# Patient Record
Sex: FEMALE | Race: WHITE | Employment: FULL TIME | URBAN - METROPOLITAN AREA
[De-identification: names, ages, dates, MRNs, and addresses within clinical notes are randomized per-mention and may not be internally consistent; named-entity substitution may affect disease eponyms.]

---

## 2017-12-06 PROBLEM — Z34.90 TERM PREGNANCY: Status: ACTIVE | Noted: 2017-12-06

## 2017-12-06 PROBLEM — Z34.93 NORMAL PREGNANCY IN THIRD TRIMESTER: Status: ACTIVE | Noted: 2017-12-06

## 2017-12-16 PROBLEM — Z34.93 NORMAL PREGNANCY IN THIRD TRIMESTER: Status: RESOLVED | Noted: 2017-12-06 | Resolved: 2017-12-16

## 2021-06-04 ENCOUNTER — INITIAL CONSULT (OUTPATIENT)
Dept: BARIATRICS/WEIGHT MGMT | Age: 41
End: 2021-06-04
Payer: COMMERCIAL

## 2021-06-04 VITALS
TEMPERATURE: 98.1 F | HEART RATE: 61 BPM | SYSTOLIC BLOOD PRESSURE: 146 MMHG | BODY MASS INDEX: 34.91 KG/M2 | DIASTOLIC BLOOD PRESSURE: 83 MMHG | HEIGHT: 64 IN | WEIGHT: 204.5 LBS

## 2021-06-04 DIAGNOSIS — K30 INDIGESTION: ICD-10-CM

## 2021-06-04 DIAGNOSIS — E66.01 MORBID OBESITY DUE TO EXCESS CALORIES (HCC): Primary | ICD-10-CM

## 2021-06-04 DIAGNOSIS — K21.9 GASTROESOPHAGEAL REFLUX DISEASE WITHOUT ESOPHAGITIS: ICD-10-CM

## 2021-06-04 PROCEDURE — 99202 OFFICE O/P NEW SF 15 MIN: CPT

## 2021-06-04 RX ORDER — SODIUM CHLORIDE 0.9 % (FLUSH) 0.9 %
10 SYRINGE (ML) INJECTION PRN
Status: CANCELLED | OUTPATIENT
Start: 2021-06-04

## 2021-06-04 RX ORDER — SODIUM CHLORIDE 9 MG/ML
25 INJECTION, SOLUTION INTRAVENOUS PRN
Status: CANCELLED | OUTPATIENT
Start: 2021-06-04

## 2021-06-04 RX ORDER — SODIUM CHLORIDE 0.9 % (FLUSH) 0.9 %
10 SYRINGE (ML) INJECTION EVERY 12 HOURS SCHEDULED
Status: CANCELLED | OUTPATIENT
Start: 2021-06-04

## 2021-06-04 RX ORDER — SODIUM CHLORIDE 9 MG/ML
INJECTION, SOLUTION INTRAVENOUS CONTINUOUS
Status: CANCELLED | OUTPATIENT
Start: 2021-06-04

## 2021-06-04 NOTE — PATIENT INSTRUCTIONS
What is the next step to proceed with weight loss surgery? Please be aware that any co-pays or deductibles may be requested prior to testing and / or procedures. You will need to schedule a psychological evaluation for weight loss surgery. Patients will be required to complete all psychological testing as required by the mental health provider. Patients must also follow all of the provider's recommendations before weight loss surgery can be scheduled. The evaluation must be done a standard way for weight loss surgery. We strongly recommend that you contact one of our preferred providers listed below to arrange this:      Pratik Mack, [facility]  [address] [place], [place]   [phone]    [facility] and [address]   [phone]    Dr. Ghulam Mcpherson, PhD    Lata Caraballo. [place], [place]    [phone]      You will also need to plan on attending a 2 hour nutrition class at the [facility] prior to your surgery. We will schedule this for you when we schedule your surgery. Please remember to have your labs drawn 10 days prior to your first scheduled dietary appointment. Please remember, that while we will submit your case to insurance for surgery authorization, it is your responsibility to know if your plan covers weight loss surgery and keep up-to-date with changes to your insurance coverage. We will do everything possible to help you get approved for weight loss surgery, but cannot guarantee an approval.     Please note that you will not be submitted to your insurance company until all pre-operative testing requirements are met.

## 2021-06-04 NOTE — PROGRESS NOTES
tenderness, no echymosis or abrasions  Heart: reg rate, no murmur  Abdomen: soft, nondistended, nontender, obese  Extremities: full ROM all 4 ext, no gross motor or sensory deficits  Pulses: 2+ distal  Skin: warm and dry  Neurologic: spontanous eye opening, purposeful, follows complex commands      Assessment:  Morbid obesity with inability to keep the weight off with diet and exercise. She is interested in Laparoscopic Kale-en- Y Gastric Bypass or Sleeve Gastrectomy. We discussed that our goal is to ameliorate the medical problems and not to obtain a specific body mass index. She understands the risks and benefits, and wishes to proceed with the evaluation. Plan:  Psych evaluation, medical clearance, gallbladder ultrasound. These patients often have vitamin deficiencies so I will do screening labs for malnutrition. I will do an upper endoscopy to check for hiatal hernias, ulcers and H. Pylori while we wait for insurance approval for the surgery. I have spent over 45min in evaluation of the patient including greater than 50% of that time face to face discussing surgical options, medical and surgical history, plans for medical weight loss management and preoperative clearance for surgery. They did not have family present for the discussion and visual aides and drawings were used to explain anatomy and surgical procedures.      Physician Signature: Electronically signed by Dr. Karen Mccrary MD    Send copy of H&P to PCP, Kavya Bray DO

## 2021-06-23 ENCOUNTER — HOSPITAL ENCOUNTER (OUTPATIENT)
Age: 41
Discharge: HOME | End: 2021-06-25
Payer: COMMERCIAL

## 2021-06-23 DIAGNOSIS — Z01.818 PREOP TESTING: ICD-10-CM

## 2021-06-23 PROCEDURE — U0003 INFECTIOUS AGENT DETECTION BY NUCLEIC ACID (DNA OR RNA); SEVERE ACUTE RESPIRATORY SYNDROME CORONAVIRUS 2 (SARS-COV-2) (CORONAVIRUS DISEASE [COVID-19]), AMPLIFIED PROBE TECHNIQUE, MAKING USE OF HIGH THROUGHPUT TECHNOLOGIES AS DESCRIBED BY CMS-2020-01-R: HCPCS

## 2021-06-23 PROCEDURE — U0005 INFEC AGEN DETEC AMPLI PROBE: HCPCS

## 2021-06-24 LAB — SARS-COV-2, PCR: NOT DETECTED

## 2021-06-25 NOTE — PROGRESS NOTES
[address] [facility]                                                                                                                    PRE OP INSTRUCTIONS FOR  Jostin Bartholomew        Date: 6/25/2021    Date of surgery: 6/28/21   Arrival Time: Hospital will call you between 5pm and 7pm with your final arrival time for surgery    1. Do not eat or drink anything after midnight prior to surgery. This includes no water, chewing gum, mints or ice chips. 2. Take the following medications with a small sip of water on the morning of Surgery: none     3. Diabetics may take evening dose of insulin but none after midnight. If you feel symptomatic or low blood sugar morning of surgery drink 1-2 ounces of apple juice only. 4. Aspirin, Ibuprofen, Advil, Naproxen, Vitamin E and other Anti-inflammatory products should be stopped  before surgery  as directed by your physician. Take Tylenol only unless instructed otherwise by your surgeon. 5. Check with your Doctor regarding stopping Plavix, Coumadin, Lovenox, Eliquis, Effient, or other blood thinners. 6. Do not smoke,use illicit drugs and do not drink any alcoholic beverages 24 hours prior to surgery. 7. You may brush your teeth the morning of surgery. DO NOT SWALLOW WATER    8. You MUST make arrangements for a responsible adult to take you home after your surgery. You will not be allowed to leave alone or drive yourself home. It is strongly suggested someone stay with you the first 24 hrs. Your surgery will be cancelled if you do not have a ride home. 9. PEDIATRIC PATIENTS ONLY:  A parent/legal guardian must accompany a child scheduled for surgery and plan to stay at the hospital until the child is discharged. Please do not bring other children with you.     10. Please wear simple, loose fitting clothing to the hospital.  Judy Kruse not bring valuables (money, credit cards, checkbooks, etc.) Do not wear any makeup (including no eye makeup) or nail polish on your fingers or toes. 11. DO NOT wear any jewelry or piercings on day of surgery. All body piercing jewelry must be removed. 12. Shower the night before surgery with _x__Antibacterial soap /JASS WIPES________    13. TOTAL JOINT REPLACEMENT/HYSTERECTOMY PATIENTS ONLY---Remember to bring Blood Bank bracelet to the hospital on the day of surgery. 14. If you have a Living Will and Durable Power of  for Healthcare, please bring in a copy. 15. If appropriate bring crutches, inspirex, WALKER, CANE etc... 12. Notify your Surgeon if you develop any illness between now and surgery time, cough, cold, fever, sore throat, nausea, vomiting, etc.  Please notify your surgeon if you experience dizziness, shortness of breath or blurred vision between now & the time of your surgery. 17. If you have ___dentures, they will be removed before going to the OR; we will provide you a container. If you wear _x__contact lenses or ___glasses, they will be removed; please bring a case for them. 18. To provide excellent care visitors will be limited to 2 in the room at any given time. 19. Please bring picture ID and insurance card. 20. Sleep apnea patients need to bring CPAP AND SETTINGS to hospital on day of surgery. 21. During flu season no children under the age of 15 are permitted in the hospital for the safety of all patients. 22. Other please check in at the main lobby/wear a mask. Please call AMBULATORY CARE if you have any further questions.    [address]     [address]

## 2021-06-28 ENCOUNTER — HOSPITAL ENCOUNTER (OUTPATIENT)
Age: 41
Setting detail: OUTPATIENT SURGERY
Discharge: HOME | End: 2021-06-28
Attending: SURGERY | Admitting: SURGERY
Payer: COMMERCIAL

## 2021-06-28 ENCOUNTER — ANESTHESIA EVENT (OUTPATIENT)
Dept: ENDOSCOPY | Age: 41
End: 2021-06-28
Payer: COMMERCIAL

## 2021-06-28 ENCOUNTER — HOSPITAL ENCOUNTER (OUTPATIENT)
Dept: ULTRASOUND IMAGING | Age: 41
Discharge: HOME | End: 2021-06-28
Payer: COMMERCIAL

## 2021-06-28 ENCOUNTER — ANESTHESIA (OUTPATIENT)
Dept: ENDOSCOPY | Age: 41
End: 2021-06-28
Payer: COMMERCIAL

## 2021-06-28 VITALS
WEIGHT: 202.3 LBS | DIASTOLIC BLOOD PRESSURE: 71 MMHG | RESPIRATION RATE: 16 BRPM | BODY MASS INDEX: 34.54 KG/M2 | HEART RATE: 65 BPM | SYSTOLIC BLOOD PRESSURE: 131 MMHG | TEMPERATURE: 97.8 F | HEIGHT: 64 IN | OXYGEN SATURATION: 99 %

## 2021-06-28 VITALS
RESPIRATION RATE: 6 BRPM | DIASTOLIC BLOOD PRESSURE: 95 MMHG | OXYGEN SATURATION: 93 % | SYSTOLIC BLOOD PRESSURE: 164 MMHG

## 2021-06-28 DIAGNOSIS — E66.01 MORBID OBESITY DUE TO EXCESS CALORIES (HCC): ICD-10-CM

## 2021-06-28 DIAGNOSIS — Z01.818 PREOP TESTING: Primary | ICD-10-CM

## 2021-06-28 DIAGNOSIS — K30 INDIGESTION: ICD-10-CM

## 2021-06-28 LAB
ALBUMIN SERPL-MCNC: 4.2 G/DL (ref 3.5–5.2)
ALP BLD-CCNC: 91 U/L (ref 35–104)
ALT SERPL-CCNC: 19 U/L (ref 0–32)
ANION GAP SERPL CALCULATED.3IONS-SCNC: 9 MMOL/L (ref 7–16)
AST SERPL-CCNC: 18 U/L (ref 0–31)
BILIRUB SERPL-MCNC: 0.5 MG/DL (ref 0–1.2)
BUN BLDV-MCNC: 21 MG/DL (ref 6–20)
CALCIUM SERPL-MCNC: 9.2 MG/DL (ref 8.6–10.2)
CHLORIDE BLD-SCNC: 105 MMOL/L (ref 98–107)
CHOLESTEROL, TOTAL: 175 MG/DL (ref 0–199)
CO2: 26 MMOL/L (ref 22–29)
CREAT SERPL-MCNC: 0.9 MG/DL (ref 0.5–1)
FERRITIN: 18 NG/ML
FOLATE: 9.9 NG/ML (ref 4.8–24.2)
GFR AFRICAN AMERICAN: >60
GFR NON-AFRICAN AMERICAN: >60 ML/MIN/1.73
GLUCOSE BLD-MCNC: 113 MG/DL (ref 74–99)
HBA1C MFR BLD: 4.8 % (ref 4–5.6)
HCG(URINE) PREGNANCY TEST: NEGATIVE
HCT VFR BLD CALC: 40.7 % (ref 34–48)
HEMOGLOBIN: 13.1 G/DL (ref 11.5–15.5)
MCH RBC QN AUTO: 28.5 PG (ref 26–35)
MCHC RBC AUTO-ENTMCNC: 32.2 % (ref 32–34.5)
MCV RBC AUTO: 88.5 FL (ref 80–99.9)
PDW BLD-RTO: 12.9 FL (ref 11.5–15)
PLATELET # BLD: 169 E9/L (ref 130–450)
PMV BLD AUTO: 12.2 FL (ref 7–12)
POTASSIUM SERPL-SCNC: 4.4 MMOL/L (ref 3.5–5)
RBC # BLD: 4.6 E12/L (ref 3.5–5.5)
SODIUM BLD-SCNC: 140 MMOL/L (ref 132–146)
TOTAL PROTEIN: 7.2 G/DL (ref 6.4–8.3)
TRIGL SERPL-MCNC: 62 MG/DL (ref 0–149)
VITAMIN B-12: 575 PG/ML (ref 211–946)
VITAMIN D 25-HYDROXY: 25 NG/ML (ref 30–100)
WBC # BLD: 4.6 E9/L (ref 4.5–11.5)

## 2021-06-28 PROCEDURE — 6360000002 HC RX W HCPCS: Performed by: NURSE ANESTHETIST, CERTIFIED REGISTERED

## 2021-06-28 PROCEDURE — 2709999900 HC NON-CHARGEABLE SUPPLY: Performed by: SURGERY

## 2021-06-28 PROCEDURE — 82306 VITAMIN D 25 HYDROXY: CPT

## 2021-06-28 PROCEDURE — 88305 TISSUE EXAM BY PATHOLOGIST: CPT

## 2021-06-28 PROCEDURE — 7100000010 HC PHASE II RECOVERY - FIRST 15 MIN: Performed by: SURGERY

## 2021-06-28 PROCEDURE — 84425 ASSAY OF VITAMIN B-1: CPT

## 2021-06-28 PROCEDURE — 85027 COMPLETE CBC AUTOMATED: CPT

## 2021-06-28 PROCEDURE — 3609012400 HC EGD TRANSORAL BIOPSY SINGLE/MULTIPLE: Performed by: SURGERY

## 2021-06-28 PROCEDURE — 82607 VITAMIN B-12: CPT

## 2021-06-28 PROCEDURE — 83036 HEMOGLOBIN GLYCOSYLATED A1C: CPT

## 2021-06-28 PROCEDURE — 81025 URINE PREGNANCY TEST: CPT

## 2021-06-28 PROCEDURE — 84478 ASSAY OF TRIGLYCERIDES: CPT

## 2021-06-28 PROCEDURE — 3700000000 HC ANESTHESIA ATTENDED CARE: Performed by: SURGERY

## 2021-06-28 PROCEDURE — 82728 ASSAY OF FERRITIN: CPT

## 2021-06-28 PROCEDURE — 2580000003 HC RX 258: Performed by: SURGERY

## 2021-06-28 PROCEDURE — 84630 ASSAY OF ZINC: CPT

## 2021-06-28 PROCEDURE — 80053 COMPREHEN METABOLIC PANEL: CPT

## 2021-06-28 PROCEDURE — 76705 ECHO EXAM OF ABDOMEN: CPT

## 2021-06-28 PROCEDURE — 82465 ASSAY BLD/SERUM CHOLESTEROL: CPT

## 2021-06-28 PROCEDURE — 7100000011 HC PHASE II RECOVERY - ADDTL 15 MIN: Performed by: SURGERY

## 2021-06-28 PROCEDURE — 82746 ASSAY OF FOLIC ACID SERUM: CPT

## 2021-06-28 PROCEDURE — 36415 COLL VENOUS BLD VENIPUNCTURE: CPT

## 2021-06-28 RX ORDER — SODIUM CHLORIDE 9 MG/ML
25 INJECTION, SOLUTION INTRAVENOUS PRN
Status: DISCONTINUED | OUTPATIENT
Start: 2021-06-28 | End: 2021-06-29 | Stop reason: HOSPADM

## 2021-06-28 RX ORDER — SODIUM CHLORIDE 0.9 % (FLUSH) 0.9 %
10 SYRINGE (ML) INJECTION PRN
Status: DISCONTINUED | OUTPATIENT
Start: 2021-06-28 | End: 2021-06-29 | Stop reason: HOSPADM

## 2021-06-28 RX ORDER — PROPOFOL 10 MG/ML
INJECTION, EMULSION INTRAVENOUS CONTINUOUS PRN
Status: DISCONTINUED | OUTPATIENT
Start: 2021-06-28 | End: 2021-06-28 | Stop reason: SDUPTHER

## 2021-06-28 RX ORDER — SODIUM CHLORIDE 9 MG/ML
INJECTION, SOLUTION INTRAVENOUS CONTINUOUS
Status: DISCONTINUED | OUTPATIENT
Start: 2021-06-28 | End: 2021-06-29 | Stop reason: HOSPADM

## 2021-06-28 RX ORDER — SODIUM CHLORIDE 0.9 % (FLUSH) 0.9 %
10 SYRINGE (ML) INJECTION EVERY 12 HOURS SCHEDULED
Status: DISCONTINUED | OUTPATIENT
Start: 2021-06-28 | End: 2021-06-29 | Stop reason: HOSPADM

## 2021-06-28 RX ADMIN — PROPOFOL 140 MCG/KG/MIN: 10 INJECTION, EMULSION INTRAVENOUS at 12:43

## 2021-06-28 RX ADMIN — SODIUM CHLORIDE: 9 INJECTION, SOLUTION INTRAVENOUS at 12:41

## 2021-06-28 ASSESSMENT — PAIN SCALES - GENERAL: PAINLEVEL_OUTOF10: 0

## 2021-06-28 NOTE — ANESTHESIA PRE PROCEDURE
Department of Anesthesiology  Preprocedure Note       Name:  Jose De Jesus Stiles   Age:  46 y.o.  :  1980                                          MRN:  [id number]         Date:  2021      Surgeon: Laila Maldonado):  Roma Ortega MD    Procedure: Procedure(s):  EGD ESOPHAGOGASTRODUODENOSCOPY    Medications prior to admission:   Prior to Admission medications    Medication Sig Start Date End Date Taking?  Authorizing Provider   Multiple Vitamins-Minerals (THERAPEUTIC MULTIVITAMIN-MINERALS) tablet Take 1 tablet by mouth daily    Historical Provider, MD   fluticasone (FLONASE) 50 MCG/ACT nasal spray 2 sprays by Nasal route daily 1/3/17   Marquis Rincon DO       Current medications:    Current Facility-Administered Medications   Medication Dose Route Frequency Provider Last Rate Last Admin    0.9 % sodium chloride infusion   Intravenous Continuous Roma Ortega MD        0.9 % sodium chloride infusion  25 mL Intravenous PRN Roma Ortega MD        sodium chloride flush 0.9 % injection 10 mL  10 mL Intravenous 2 times per day Roma Ortega MD        sodium chloride flush 0.9 % injection 10 mL  10 mL Intravenous PRN Roma Ortega MD           Allergies:  No Known Allergies    Problem List:    Patient Active Problem List   Diagnosis Code    Brachial plexopathy G54.0    Numbness and tingling R20.0, R20.2    Right C6 Radiculopathy M54.10    Term pregnancy Z34.90    Normal vaginal delivery O80       Past Medical History:        Diagnosis Date    Diabetes mellitus ([address]     gestational    Morbid (severe) obesity due to excess calories ([address]     Prolonged emergence from general anesthesia     Rh incompatibility        Past Surgical History:        Procedure Laterality Date    BLADDER SURGERY      TUBAL LIGATION  0079,8390    twice with reversal inbetween       Social History:    Social History     Tobacco Use    Smoking status: Never Smoker    Smokeless tobacco: Never Used   Substance found for: HIV, HEPCAB    COVID-19 Screening (If Applicable):   Lab Results   Component Value Date    COVID19 Not Detected 06/23/2021           Anesthesia Evaluation  Patient summary reviewed no history of anesthetic complications:   Airway: Mallampati: II  TM distance: >3 FB   Neck ROM: full  Mouth opening: > = 3 FB Dental: normal exam     Comment: Upper and lower braces    Pulmonary:Negative Pulmonary ROS breath sounds clear to auscultation                             Cardiovascular:Negative CV ROS            Rhythm: regular             Beta Blocker:  Not on Beta Blocker         Neuro/Psych:   (+) neuromuscular disease:,             GI/Hepatic/Renal:   (+) GERD:, morbid obesity          Endo/Other:    (+) Diabetes (gestational ), . Abdominal:             Vascular: negative vascular ROS. Other Findings:           Anesthesia Plan      MAC     ASA 2       Induction: intravenous. MIPS: Prophylactic antiemetics administered. Anesthetic plan and risks discussed with patient. Plan discussed with CRNA.           [address],    6/28/2021

## 2021-06-28 NOTE — H&P
Diana Gonzalez  6/28/2021  [facility]    Initial Evaluation  Bariatric Surgery and EGD       Subjective:  CHIEF COMPLAINT: Morbid obesity, malnutrition, Degenerative Joint Disease (DJD), Depression, Anxiety and Extremity Edema    HISTORY OF PRESENT ILLNESS: Diana Gonzalez is a morbidly-obese 46 y.o.  female, who weighs 204 lb 8 oz (92.8 kg). She is 55 pounds over her ideal body weight. The severity is severe with Body mass index is 34.72 kg/m². She has multiple medical problems aggravated by her obesity. They have been overweight since age 29. She wishes to have bariatric surgery so that she can lose a large amount of weight to a goal of their ideal body weight based on height, build and sex, and keep the weight off. I have [facility] where we discussed the surgery in great detail and went over the risks and benefits. She has watched our informational video so she understands all of the extensive risks involved. She states that she understands all of the risks and wishes to proceed with the evaluation. We have discussed the different surgical options in detail with use of diagrams and drawings to detail the procedures, risks and alternatives. We discussed the postoperative diet and proposed life long diet for weight management after surgery. They are a nonsmoker  They have no significant exposure to second hand smoke    Past Medical History  Patient Active Problem List   Diagnosis    Brachial plexopathy    Numbness and tingling    Right C6 Radiculopathy    Term pregnancy    Normal vaginal delivery     Past Surgical History  Past Surgical History:   Procedure Laterality Date    BLADDER SURGERY  2013    TUBAL LIGATION  5543,1516    twice with reversal inbetween      Allergies: Patient has no known allergies.      Home Medications  Current Facility-Administered Medications   Medication Dose Route Frequency Provider Last Rate Last Admin    0.9 % sodium chloride infusion   Intravenous Continuous Sybil Callejas  mL/hr at 06/28/21 1246 Rate Change at 06/28/21 1246    0.9 % sodium chloride infusion  25 mL Intravenous PRN Sybil Callejas MD        sodium chloride flush 0.9 % injection 10 mL  10 mL Intravenous 2 times per day Sybil Callejas MD        sodium chloride flush 0.9 % injection 10 mL  10 mL Intravenous PRN Sybil Callejas MD           Social History:   TOBACCO:   reports that she has never smoked. She has never used smokeless tobacco.  All smokers must join the free smoking cessation program and stop smoking for 3 months before having any Bariatric surgery. ETOH:    reports no history of alcohol use. The patient has a family history that is negative for severe cardiovascular or respiratory issues, negative for reaction to anesthesia. Review of Systems    A complete 10 system review was performed and are otherwise negative unless mentioned in the above HPI. Specific negatives are listed below but may not include all those reviewed.     General ROS: negative obtundation, AMS  ENT ROS: negative rhinorrhea, epistaxis  Allergy and Immunology ROS: negative itchy/watery eyes or nasal congestion  Hematological and Lymphatic ROS: negative spontaneous bleeding or bruising  Endocrine ROS: negative  lethargy, mood swings, palpitations or polydipsia/polyuria  Respiratory ROS: negative sputum changes, stridor, tachypnea or wheezing  Cardiovascular ROS: negative for - loss of consciousness, murmur or orthopnea  Gastrointestinal ROS: negative for - hematochezia or hematemesis  Genito-Urinary ROS: negative for -  genital discharge or hematuria  Musculoskeletal ROS: negative for - focal weakness, gangrene  Psych/Neuro ROS: negative for - visual or auditory hallucinations, suicidal ideation      Physical Exam:   VITALS: /71   Pulse 65   Temp 97.8 °F (36.6 °C) (Temporal)   Resp 16   Ht 5' 4\" (1.626 m)   Wt 202 lb 4.8 oz (91.8 kg)   LMP 06/14/2021   SpO2 99%   BMI 34.72 kg/m²   General appearance: AAO, NAD  Eyes: PERRL, EOMI, red conjunctiva  Lungs: equal chest rise bilateral, no wheeze, no rhonchi  Chest wall: atraumatic, no tenderness, no echymosis or abrasions  Heart: reg rate, no murmur  Abdomen: soft, nondistended, nontender, obese  Extremities: full ROM all 4 ext, no gross motor or sensory deficits  Pulses: 2+ distal  Skin: warm and dry  Neurologic: spontanous eye opening, purposeful, follows complex commands      Assessment:  Morbid obesity with inability to keep the weight off with diet and exercise. She is interested in Laparoscopic Kale-en- Y Gastric Bypass or Sleeve Gastrectomy. We discussed that our goal is to ameliorate the medical problems and not to obtain a specific body mass index. She understands the risks and benefits, and wishes to proceed with the evaluation. Plan:  Psych evaluation, medical clearance, gallbladder ultrasound. These patients often have vitamin deficiencies so I will do screening labs for malnutrition. I will do an upper endoscopy to check for hiatal hernias, ulcers and H. Pylori while we wait for insurance approval for the surgery. I have spent over 45min in evaluation of the patient including greater than 50% of that time face to face discussing surgical options, medical and surgical history, plans for medical weight loss management and preoperative clearance for surgery. They did not have family present for the discussion and visual aides and drawings were used to explain anatomy and surgical procedures.      Physician Signature: Electronically signed by Dr. Terry Griffith MD    Send copy of H&P to PCP, Elías Alarcon DO

## 2021-06-28 NOTE — ANESTHESIA POSTPROCEDURE EVALUATION
Department of Anesthesiology  Postprocedure Note    Patient: Laura Sandoval  MRN: [id number]  YOB: 1980  Date of evaluation: 6/28/2021  Time:  2:59 PM     Procedure Summary     Date: 06/28/21 Room / Location: [address] [place] [address] 01 / [address] [facility], [place], operated by [facility]    Anesthesia Start: 4770 Anesthesia Stop: 9291    Procedure: EGD BIOPSY (N/A ) Diagnosis: (GERD)    Surgeons: Isidro Meng MD Responsible Provider: Nhung Ware DO    Anesthesia Type: MAC ASA Status: 2          Anesthesia Type: MAC    Stephanie Phase I:      Stephanie Phase II: Stephanie Score: 10    Last vitals: Reviewed and per EMR flowsheets.        Anesthesia Post Evaluation    Patient location during evaluation: PACU  Patient participation: complete - patient participated  Level of consciousness: awake and alert  Airway patency: patent  Nausea & Vomiting: no nausea and no vomiting  Complications: no  Cardiovascular status: hemodynamically stable  Respiratory status: acceptable  Hydration status: euvolemic

## 2021-07-01 LAB — ZINC: 79 UG/DL (ref 60–120)

## 2021-07-02 LAB — VITAMIN B1 WHOLE BLOOD: 121 NMOL/L (ref 70–180)

## 2021-07-09 ENCOUNTER — OFFICE VISIT (OUTPATIENT)
Dept: BARIATRICS/WEIGHT MGMT | Age: 41
End: 2021-07-09
Payer: COMMERCIAL

## 2021-07-09 VITALS
SYSTOLIC BLOOD PRESSURE: 151 MMHG | DIASTOLIC BLOOD PRESSURE: 91 MMHG | BODY MASS INDEX: 34.91 KG/M2 | HEART RATE: 64 BPM | WEIGHT: 204.5 LBS | TEMPERATURE: 98.4 F | HEIGHT: 64 IN | RESPIRATION RATE: 20 BRPM

## 2021-07-09 DIAGNOSIS — E66.01 MORBID OBESITY DUE TO EXCESS CALORIES (HCC): Primary | ICD-10-CM

## 2021-07-09 DIAGNOSIS — K21.9 GASTROESOPHAGEAL REFLUX DISEASE WITHOUT ESOPHAGITIS: ICD-10-CM

## 2021-07-09 DIAGNOSIS — E55.9 VITAMIN D DEFICIENCY: ICD-10-CM

## 2021-07-09 PROCEDURE — 99211 OFF/OP EST MAY X REQ PHY/QHP: CPT

## 2021-07-09 NOTE — PATIENT INSTRUCTIONS
What is the next step to proceed with weight loss surgery? Please be aware that any co-pays or deductibles may be requested prior to testing and / or procedures. You will need to schedule a psychological evaluation for weight loss surgery. Patients will be required to complete all psychological testing as required by the mental health provider. Patients must also follow all of the provider's recommendations before weight loss surgery can be scheduled. The evaluation must be done a standard way for weight loss surgery. We strongly recommend that you contact one of our preferred providers listed below to arrange this:      Pratik Enriquez, [facility]  [address] [place], [place]   [phone]    [facility] and [address]   [phone]    Dr. Karen Johnson, PhD    [facility]. [place], [place]    [phone]      You will also need to plan on attending a 2 hour nutrition class at the [facility] prior to your surgery. We will schedule this for you when we schedule your surgery. Please remember to have your labs drawn 10 days prior to your first scheduled dietary appointment. Please remember, that while we will submit your case to insurance for surgery authorization, it is your responsibility to know if your plan covers weight loss surgery and keep up-to-date with changes to your insurance coverage. We will do everything possible to help you get approved for weight loss surgery, but cannot guarantee an approval.     Please note that you will not be submitted to your insurance company until all pre-operative testing requirements are met.

## 2021-07-20 ENCOUNTER — INITIAL CONSULT (OUTPATIENT)
Dept: BARIATRICS/WEIGHT MGMT | Age: 41
End: 2021-07-20
Payer: COMMERCIAL

## 2021-07-20 ENCOUNTER — TELEPHONE (OUTPATIENT)
Dept: BARIATRICS/WEIGHT MGMT | Age: 41
End: 2021-07-20

## 2021-07-20 VITALS — WEIGHT: 204.5 LBS | BODY MASS INDEX: 34.91 KG/M2 | HEIGHT: 64 IN

## 2021-07-20 DIAGNOSIS — Z71.3 NUTRITIONAL COUNSELING: Primary | ICD-10-CM

## 2021-07-20 DIAGNOSIS — Z00.8 NUTRITIONAL ASSESSMENT: ICD-10-CM

## 2021-07-20 PROCEDURE — 99999 PR OFFICE/OUTPT VISIT,PROCEDURE ONLY: CPT | Performed by: DIETITIAN, REGISTERED

## 2021-07-20 NOTE — PATIENT INSTRUCTIONS
[facility]  Dietary Initial Appointment Patient Instructions    By: Jo Sanchez RD / DEREJE  [phone]      At your initial dietary appointment you have received the following information packets:    Please be aware at each visit you have been instructed that in order for your insurance company to approve your surgery you must show a consistent weight loss of 2 lbs or greater at each visit. We can not guarantee an approval by your insurance company we can only provide the information given to us it is up to you the patient to show compliancy to your insurance company. If you do gain weight during your supervised weight loss counseling sessions insurance companies starting in 2018 are denying patients for not showing consistent weight loss results when part of a supervised weight loss counseling program. Pt was instructed on 7/20/21. Pt verbalized understanding. · Low-Fat Diet  - Please be sure to begin your low-fat diet from today's date until your scheduled surgery date. If you are not at goal weight by your history and physical appointment with your surgeon your surgery will be cancelled. · Goal Weight: 199.2 lbs BMI: 34.2 - Pt was instructed to not lose weight until insurance approval.   · Low-Fat Diet Contract - Patient Acknowledge and Signed  · Supplement List - Please be sure to be saving to purchase your 3 month supply of supplements. If you do not bring supplements to your history and physical appointment your surgery will be cancelled. · Supplement Contract - Patient Acknowledge and Signed  · Please be sure to take a daily Multi-vitamin from now until surgery to reduce your incidence of infection. · If your insurance company requires additional dietary counseling you will be scheduled to see the dietitian the following month.   ·  If your psychological evaluation is completed and all your dietary requirements for your individual insurance company have been completed you will be submitted to insurance. Please make sure to check with us to see if we have received your psychological evaluation. Please be aware that any co-pays or deductibles may be requested prior to testing and / or procedures. You will need to schedule a psychological evaluation for weight loss surgery. Patients will be required to complete all psychological testing as required by the psychologist. Patients must follow all of the psychologist's recommendations before weight loss surgery can be scheduled. The evaluation must be done a standard way for weight loss surgery. We strongly recommend that you contact one of our preferred providers listed below to arrange this:    [address] [facility]                                                              [address] [place] [address]                                                                                      [phone]     Joshua Schroeder [address] [place] [address]                                                                                                [phone]        Dr. Olivia Prescott, PhD                         [facility] Brown. [place], [place]                                                                                              [phone]     You will also need to plan on attending a 2 hour nutrition class at the [facility] prior to your surgery. We will schedule this for you when we schedule your surgery. Please remember to have your labs drawn prior to your scheduled appointment to review the results of your testing. Please remember, that while we will submit your case to insurance for surgery authorization, it is your responsibility to know if your plan covers weight loss surgery and keep up-to-date with changes to your insurance coverage.   We will do everything possible to help you get approved for weight loss surgery, but cannot guarantee an approval.      Please note that you will not be submitted to your insurance company until all   pre-operative testing requirements are met. · Please remember you need to schedule to attend one Support Group meeting held at the [facility] before surgery. This one meeting is mandatory. You can attend as many support group meetings before and after surgery. Support group meetings are held at the [facility] from 6:00 - 8:00pm 1st, and 3rd Tuesday of every month. See dates listed below. · Each individual person has his / her own medical requirements that need fulfilled before being able to schedule bariatric surgery . Please finalize these requirements by contacting our Bariatric Nurse at [phone].

## 2021-07-20 NOTE — PROGRESS NOTES
[facility]:  Initial Nutrition Consultation    Today's Date: 7/20/2021  Patients Name:Belle Mitchell     Height: 5' 4\" (1.626 m)   Weight: 204 lb 8 oz (92.8 kg)   IBW: 151 lbs  %IBW: 135%  Excess Weight: 53.5 lbs  BMI: Body mass index is 35.1 kg/m². Subjective Information:   Patient has tried multiple means of weight loss including diet and exercise in the past and has been unable to maintain a healthy weight. Pt states he / she has tried the following Keto, [place], Contrave ad Intermittent Fasting. Patients overall goal is to be able to lose weight with diet and exercise by changing lifestyle, habits and maintain a healthy weight for a lifetime. Are your currently Diabetic - No  Last Blood Glucose Reading - 6/28/21- Glucose 113H  Last HGBA1C - 6/28/21 - HGBA1C 4.8 WNL    Patient states the following will be the most difficult change after weight loss surgery? Pt states the most difficult change after weight loss surgery will be not drinking and eating at the same time. Most successful diet in the past? None  Length of time patient was on the diet listed above? 5 to 6 Months  Weight lost on the diet listed above? 1 to 2 lbs  Patient stated he / she maintained his / her weight for the following time? Pt would regain the weight back. Scale of 1 (Least Likely) to 10 ( Most Likely  - What is your readiness to change and adhere to your new diet and lifestyle change we reviewed - 10  At a level 10 - How do you foresee yourself being successful with the change  - Pt states being more active and increasing walking. Patient takes the following vitamins, minerals and dietary supplements? Yes - I currently take a Daily Complete MVI, Vitamin D3 1,000iu daily, Vitamin D2 50,000iu once a  week. Rd / Ld recommends the patient continue the following supplements from now until surgery. Patient consumes the following beverage daily?  Milk    Pre-Op Labs - 7/20/21 - Glucose 113H, BUN 21H, Vitamin D 25L, HGBA1C 4.8 WNL, Ferritin 18 WNL, HGB 13.1 WNL, HCT 40.7 WNL - Pts PCP is treating her Vitamin D Deficiency    Patient states he / she has the following problems:  no - Eating  no - Chewing  no - Swallowing  no - Nausea  no - Vomiting  yes - Diarrhea - D/T Bacterial Infection  no - Constipation  no - Hair Changes  no - Skin Changes  no - Tongue Texture    Food Allergies: no  -   Food Intolerances: no -     Yes - Past medically assisted weight loss history reviewed. Yes - Provided education regarding the basic role of nutrition in junction with Bariatric Surgery. Yes - Provided overview of required dietary and behavioral changes pre and post operatively. Yes - Stated / reinforced that changes in dietary behaviors are critical to successful, long term weight Loss. Patient is aware that in order to proceed with bariatric surgery he / she will need to follow a low-fat diet prior to surgery. Patient is aware that bariatric surgery is a lifetime change that will require the patient to follow a low-fat, low-calorie, low-sugar, portion controlled diet with at least 30 minutes of physical activity daily. Patient is aware that certain foods may not be tolerated after bariatric surgery and certain foods will need avoided all together. [address] / LD feels that this patient knowledge / readiness to make appropriate diet / lifestyle changes assessed to be? - Good    RD / LD feels this patients expected adherence for post surgical diet? - Good    Patient was instructed and signed consents on a low-fat diet and required supplementation at initial consult. Comments: Patient is able to verbalize diet concepts for bariatric surgery. Patient is aware diet concepts will be reinforced at 2-hour nutrition education consult. RD / LD will monitor progress.

## 2021-07-22 ENCOUNTER — TELEPHONE (OUTPATIENT)
Dept: BARIATRICS/WEIGHT MGMT | Age: 41
End: 2021-07-22

## 2021-07-22 NOTE — TELEPHONE ENCOUNTER
Discussed at length with patient her concerns about why she wasn't told when she started that her BMI was too low without an obesity related comorbidity. I did apologize to her for the error. She is asking for a refund of what she has already paid, and for what she still owes to be written off. I discussed with my director, and have reached out to [employer] for assistance with these adjustments. Will keep patient updated on the case.

## 2024-02-27 ENCOUNTER — OFFICE VISIT (OUTPATIENT)
Dept: ENT CLINIC | Age: 44
End: 2024-02-27
Payer: COMMERCIAL

## 2024-02-27 VITALS
DIASTOLIC BLOOD PRESSURE: 80 MMHG | SYSTOLIC BLOOD PRESSURE: 125 MMHG | WEIGHT: 181 LBS | HEART RATE: 59 BPM | BODY MASS INDEX: 31.07 KG/M2

## 2024-02-27 DIAGNOSIS — R51.9 CHRONIC INTRACTABLE HEADACHE, UNSPECIFIED HEADACHE TYPE: Primary | ICD-10-CM

## 2024-02-27 DIAGNOSIS — G43.819 OTHER MIGRAINE WITHOUT STATUS MIGRAINOSUS, INTRACTABLE: ICD-10-CM

## 2024-02-27 DIAGNOSIS — G89.29 CHRONIC INTRACTABLE HEADACHE, UNSPECIFIED HEADACHE TYPE: Primary | ICD-10-CM

## 2024-02-27 PROCEDURE — 99204 OFFICE O/P NEW MOD 45 MIN: CPT | Performed by: OTOLARYNGOLOGY

## 2024-02-27 ASSESSMENT — ENCOUNTER SYMPTOMS
ALLERGIC/IMMUNOLOGIC NEGATIVE: 1
EYES NEGATIVE: 1
RESPIRATORY NEGATIVE: 1

## 2024-02-27 NOTE — PROGRESS NOTES
Department of Otolaryngology  Office Consult Note  2/27/24          Subjective:        Chief Complaint:  had concerns including New Patient (Paranasal sinus ).     Patient ID: Belle Mitchell is a 43 y.o. female.    HPI: Patient presents as  new patient for sinusitis. Patient has history of migraines described as headache, nausea, photophobia, phonophobia that have worsened and lasting longer most recently, in the process for work up had mri brain that showed mild paranasal sinus disease and presents for evaluation. Denies nasal congestion, post nasal drainage, recurrent sinus infections requiring antibiotic. Occasional sinus pressure or pain located under eyes bilaterally.   Recently has had change in vision, being followed by eye doctor  Has appointment for neurologist in 1 month     Review of Systems   Constitutional: Negative.    HENT: Negative.     Eyes: Negative.    Respiratory: Negative.     Allergic/Immunologic: Negative.    Neurological:  Positive for headaches.   Hematological: Negative.    All other systems reviewed and are negative.        Past Medical History:   Diagnosis Date    Diabetes mellitus (HCC)     gestational    Morbid (severe) obesity due to excess calories (HCC)     Prolonged emergence from general anesthesia     Rh incompatibility      Past Surgical History:   Procedure Laterality Date    BLADDER SURGERY  2013    TUBAL LIGATION  2005,2018    twice with reversal inbetween    UPPER GASTROINTESTINAL ENDOSCOPY N/A 6/28/2021    EGD BIOPSY performed by Ryan Akins MD at [facility] ENDOSCOPY       Current Outpatient Medications:     Multiple Vitamins-Minerals (THERAPEUTIC MULTIVITAMIN-MINERALS) tablet, Take 1 tablet by mouth daily, Disp: , Rfl:     fluticasone (FLONASE) 50 MCG/ACT nasal spray, 2 sprays by Nasal route daily (Patient not taking: Reported on 2/27/2024), Disp: 1 Bottle, Rfl: 5  Patient has no known allergies.  Social History     Tobacco Use    Smoking status: Never    Smokeless

## 2024-04-15 ENCOUNTER — OFFICE VISIT (OUTPATIENT)
Age: 44
End: 2024-04-15
Payer: COMMERCIAL

## 2024-04-15 VITALS
WEIGHT: 168.9 LBS | DIASTOLIC BLOOD PRESSURE: 85 MMHG | BODY MASS INDEX: 28.83 KG/M2 | HEART RATE: 88 BPM | TEMPERATURE: 99.1 F | SYSTOLIC BLOOD PRESSURE: 135 MMHG | HEIGHT: 64 IN

## 2024-04-15 DIAGNOSIS — G43.111 INTRACTABLE MIGRAINE WITH AURA WITH STATUS MIGRAINOSUS: Primary | ICD-10-CM

## 2024-04-15 DIAGNOSIS — G44.86 CERVICOGENIC HEADACHE: ICD-10-CM

## 2024-04-15 DIAGNOSIS — R11.15 CYCLICAL VOMITING SYNDROME NOT ASSOCIATED WITH MIGRAINE: ICD-10-CM

## 2024-04-15 PROCEDURE — 99204 OFFICE O/P NEW MOD 45 MIN: CPT | Performed by: PSYCHIATRY & NEUROLOGY

## 2024-04-15 RX ORDER — DULOXETIN HYDROCHLORIDE 30 MG/1
30 CAPSULE, DELAYED RELEASE ORAL DAILY
Qty: 30 CAPSULE | Refills: 2 | Status: SHIPPED | OUTPATIENT
Start: 2024-04-15

## 2024-04-15 RX ORDER — RIZATRIPTAN BENZOATE 10 MG/1
10 TABLET, ORALLY DISINTEGRATING ORAL PRN
Qty: 9 TABLET | Refills: 2 | Status: SHIPPED | OUTPATIENT
Start: 2024-04-15

## 2024-04-15 RX ORDER — UBROGEPANT 100 MG/1
TABLET ORAL
COMMUNITY
Start: 2024-04-11

## 2024-04-15 NOTE — PROGRESS NOTES
Negativefor Asthma, Negative for Other Lung Problems  Heme/Lymph:  Negative for Swollen Nodes/Glands, Negative for Bleeding Problems, Negative for Anemia  Musculoskeletal:   Negative for Joint Replacement, Negative for Broken Bones  GI:  Negative  for Gallbladder, Negative  for Blood in Stool, Negative for Diarrhea, Negative Intestinal Bleeding, Negative for Poor Appetite, Negative for Hiatal Hernia, Negative for Ulcer, Negative for Hemorrhoids, Negative for Nausea/Vomiting, Negative for Constipation  G/U:  NegativeNegative for Pain/Burning, Negative for Urinary Frequency/Urgency, Negative for Blood in Urine, Negative for Slow/Small Stream, Negative for poor Bladder Emptying, Negative for up at night to urinate, Negativesexual Dysfunction  Endo:  Negative for Cold or Heat Intolerance, Negative for Hot Flashes/Flushing, Negative for Abnormal Thirst, Negative for Change in Body Hair  Skin:   Negative for Lumps or Nodules, Negative for Breast Lumps, Negative for Rashes or Sores   Psych:  Negative for Anxiety/Depression       PHYSICAL EXAM  Nursing note and vitals  reviewed.   Constitutional: she is oriented to person, place, and time and well-developed. Well-nourished, and in no distress.  HENT:       Head:  Normocephalic and atraumatic       Nose:  Nose normal        Mouth/Throat: No oropharyngeal         Eyes: Conjunctivae and EOM are normal. Pupils are equal, round, and reactive to light. Right eye exhibits no discharge. Left eye exhibits no discharge. No scleral icterus.      Neck: Normal range of motion. Neck supple  Cardiovascular:  Normal rate, regular rhythm, normal heart sounds, and intact distal pulses. Exam reveals no gallop and no friction rub. No murmer heard.   Pulmonary/Chest:  Effort is normal and breath sounds are normal. No respiratory distress. she has no wheezes. she has no rales. she exhibits no tenderness.   Musculoskeletal: Normal range of motion. she exhibits no edema and no tenderness.

## (undated) DEVICE — KENDALL 450 SERIES MONITORING FOAM ELECTRODE - RECTANGULAR SHAPE ( 3/PK): Brand: KENDALL

## (undated) DEVICE — GOWN ISOLATN REG YEL M WT MULTIPLY SIDETIE LEV 2

## (undated) DEVICE — 6 X 9  1.75MIL 4-WALL LABGUARD: Brand: MINIGRIP COMMERCIAL LLC

## (undated) DEVICE — FORCEPS BX L240CM JAW DIA2.8MM L CAP W/ NDL MIC MESH TOOTH

## (undated) DEVICE — SPONGE GZ 4IN 4IN 4 PLY N WVN AVANT

## (undated) DEVICE — MASK,FACE,MAXFLUIDPROTECT,SHIELD/ERLPS: Brand: MEDLINE

## (undated) DEVICE — VALVE SUCTION AIR H2O HYDR H2O JET CONN STRL ORCA POD + DISP

## (undated) DEVICE — KIT BEDSIDE REVITAL OX 500ML

## (undated) DEVICE — MEDI-VAC YANKAUER SUCTION HANDLE W/BULBOUS TIP: Brand: CARDINAL HEALTH

## (undated) DEVICE — CONTAINER SPEC 480ML CLR POLYSTYR 10% NEUT BUFF FRMLN ZN

## (undated) DEVICE — CONTAINER SPEC COLL 960ML POLYPR TRIANG GRAD INTAKE/OUTPUT

## (undated) DEVICE — BLOCK BITE 60FR CAREGUARD

## (undated) DEVICE — MEDI-VAC NON-CONDUCTIVE SUCTION TUBING: Brand: CARDINAL HEALTH

## (undated) DEVICE — LUBRICANT SURG JELLY ST BACTER TUBE 4.25OZ